# Patient Record
Sex: MALE | Race: WHITE | Employment: FULL TIME | URBAN - METROPOLITAN AREA
[De-identification: names, ages, dates, MRNs, and addresses within clinical notes are randomized per-mention and may not be internally consistent; named-entity substitution may affect disease eponyms.]

---

## 2019-01-15 ENCOUNTER — HOSPITAL ENCOUNTER (EMERGENCY)
Facility: CLINIC | Age: 29
Discharge: HOME | End: 2019-01-15
Admitting: EMERGENCY MEDICINE
Payer: COMMERCIAL

## 2019-01-15 VITALS
DIASTOLIC BLOOD PRESSURE: 82 MMHG | OXYGEN SATURATION: 98 % | TEMPERATURE: 97.8 F | WEIGHT: 177 LBS | HEIGHT: 72 IN | SYSTOLIC BLOOD PRESSURE: 137 MMHG | RESPIRATION RATE: 16 BRPM | BODY MASS INDEX: 23.98 KG/M2

## 2019-01-15 DIAGNOSIS — S81.812A LACERATION OF LEG, LEFT, INITIAL ENCOUNTER: ICD-10-CM

## 2019-01-15 PROCEDURE — 12002 RPR S/N/AX/GEN/TRNK2.6-7.5CM: CPT | Mod: LT

## 2019-01-15 PROCEDURE — 99283 EMERGENCY DEPT VISIT LOW MDM: CPT

## 2019-01-15 ASSESSMENT — ENCOUNTER SYMPTOMS
NUMBNESS: 0
WOUND: 1

## 2019-01-15 ASSESSMENT — MIFFLIN-ST. JEOR: SCORE: 1800.87

## 2019-01-15 NOTE — ED AVS SNAPSHOT
Emergency Department  [address] [place] [id number]  Phone:  [phone]  Fax:  [phone]                                    Gray Allen   MRN: [id number]    Department:   Emergency Department   Date of Visit:  1/15/2019           After Visit Summary Signature Page    I have received my discharge instructions, and my questions have been answered. I have discussed any challenges I see with this plan with the nurse or doctor.    ..........................................................................................................................................  Patient/Patient Representative Signature      ..........................................................................................................................................  Patient Representative Print Name and Relationship to Patient    ..................................................               ................................................  Date                                   Time    ..........................................................................................................................................  Reviewed by Signature/Title    ...................................................              ..............................................  Date                                               Time          22EPIC Rev 08/18

## 2019-01-16 NOTE — ED PROVIDER NOTES
History     Chief Complaint:  Laceration    HPI   Gray Allen is a 28 year old male who presents with a laceration. The patient was competing in a downhill ski race this evening and at one point he fell and sustained a laceration to his left hamstring.  The patient believes the edge of his ski cut his leg during the fall and finished the race and an additional race before noticing that he was bleeding. He also suffered an abrasion to his buttocks. The patient denies any pain weakness or numbness in his leg. His tetanus immunization is up to date. No history of diabetes or immune compromise.      Allergies:  No known allergies     Medications:    The patient is currently on no regular medications.     Past Medical History:    The patient denies any significant past medical history.    Past Surgical History:    The patient does not have any pertinent past surgical history.    Family History:    No past pertinent family history.    Social History:  Patient presents alone  Marital Status:  Single      Review of Systems   Musculoskeletal: Negative for gait problem.   Skin: Positive for wound.   Neurological: Negative for numbness.   All other systems reviewed and are negative.      Physical Exam   First Vitals:  BP: 137/82  Heart Rate: 79  Temp: 97.8  F (36.6  C)  Resp: 16  Height: 182.9 cm (6')  Weight: 80.3 kg (177 lb)  SpO2: 98 %      Physical Exam  General: Alert and cooperative with exam. Resting comfortably on gurney  Head:  Scalp is NC/AT  CV:  2+ popliteal, dp, posterior tibial pulses with normal cap refill in toes.  MS:  Full ROM in hip, knee, ankle, and toes with no tendonous deficit.  Skin:  5.5 cm linear subcutaneous but shallow laceration to the left posterior thigh.  Warm and dry, No rash or lesions noted.  Neuro: Oriented x 3. No gross motor deficits.    Strength and sensation grossly intact in all 4 extremities.  Gait normal  Psych: Awake. Alert. Normal affect. Appropriate interactions.    Emergency  Department Course   Procedures:     Laceration Repair      LACERATION:  A simple minimally Contaminated 5.5 cm laceration.    LOCATION:  Left posterior thigh .    FUNCTION:  Distally sensation, circulation, motor and tendon function are intact.    ANESTHESIA:  Local using Lidocaine with epi total of 5 mLs.    PREPARATION:  Irrigation and Scrubbing with Normal Saline and Shur Clens.    DEBRIDEMENT:  wound explored, no foreign body found.    CLOSURE:  Wound was closed with One Layer.  Skin closed with 7 x 3.0 Ethylon using interrupted sutures and 1 x 3.0 Ethylon horizontal mattress suture..    Emergency Department Course:  Nursing notes and vitals reviewed.  2215: I performed an exam of the patient as documented above.     2246: I rechecked the patient. I performed a laceration repair (see procedure note). Findings and plan explained to the Patient. Patient discharged home with instructions regarding supportive care, medications, and reasons to return. The importance of close follow-up was reviewed.     Impression & Plan    Medical Decision Making:  Gray Allen is a 28 year old male who presents with laceration to left posterior thigh.  Patient history and records reviewed.  On examination, the patient has a laceration, but is otherwise well appearing.  There is no evidence of neurovascular compromise, fracture, imbedded foreign body, or tendon injury.  Wound was anesthetized, irrigated, explored, and closed as above with non-absorbale sutures.  Tetanus checked and is up-to-date.  Discussed indications to return to ED if new or worsening symptoms, or signs of infection such as fever, swelling, spreading erythema, drainage, or increasing pain.  Advised sun protection to reduce scarring.  Follow-up with primary care provider in 7 days for suture removal.      Diagnosis:    ICD-10-CM    1. Laceration of leg, left, initial encounterAcute S81.812A        Disposition:  discharged to home    Severiano MOSES, am serving as a  scribe on 1/15/2019 at 10:26 PM to personally document services performed by Leonidas Berrios PA-C based on my observations and the provider's statements to me.       Severiano Glover  1/15/2019    EMERGENCY DEPARTMENT       Leonidas Berrios PA-C  01/16/19 1804